# Patient Record
Sex: MALE | Race: WHITE | URBAN - METROPOLITAN AREA
[De-identification: names, ages, dates, MRNs, and addresses within clinical notes are randomized per-mention and may not be internally consistent; named-entity substitution may affect disease eponyms.]

---

## 2017-02-10 ENCOUNTER — CHARTING TRANS (OUTPATIENT)
Dept: OCCUPATIONAL MEDICINE | Age: 27
End: 2017-02-10

## 2017-02-16 ENCOUNTER — CHARTING TRANS (OUTPATIENT)
Dept: OCCUPATIONAL MEDICINE | Age: 27
End: 2017-02-16

## 2017-02-28 ENCOUNTER — CHARTING TRANS (OUTPATIENT)
Dept: OTHER | Age: 27
End: 2017-02-28

## 2017-03-14 ENCOUNTER — CHARTING TRANS (OUTPATIENT)
Dept: OCCUPATIONAL MEDICINE | Age: 27
End: 2017-03-14

## 2017-09-14 ENCOUNTER — APPOINTMENT (OUTPATIENT)
Dept: OCCUPATIONAL MEDICINE | Age: 27
End: 2017-09-14
Attending: EMERGENCY MEDICINE

## 2018-11-28 VITALS — TEMPERATURE: 97.8 F | HEART RATE: 82 BPM | DIASTOLIC BLOOD PRESSURE: 64 MMHG | SYSTOLIC BLOOD PRESSURE: 137 MMHG

## 2018-11-29 VITALS
TEMPERATURE: 97.2 F | DIASTOLIC BLOOD PRESSURE: 81 MMHG | SYSTOLIC BLOOD PRESSURE: 146 MMHG | RESPIRATION RATE: 16 BRPM | HEART RATE: 83 BPM

## 2018-11-29 VITALS
BODY MASS INDEX: 26.18 KG/M2 | HEART RATE: 88 BPM | TEMPERATURE: 97.4 F | RESPIRATION RATE: 16 BRPM | SYSTOLIC BLOOD PRESSURE: 150 MMHG | HEIGHT: 76 IN | DIASTOLIC BLOOD PRESSURE: 88 MMHG | WEIGHT: 215 LBS

## 2019-01-30 ENCOUNTER — HOSPITAL ENCOUNTER (OUTPATIENT)
Age: 29
Discharge: HOME | End: 2019-01-30
Attending: FAMILY MEDICINE

## 2019-01-30 VITALS
TEMPERATURE: 98 F | HEART RATE: 84 BPM | SYSTOLIC BLOOD PRESSURE: 129 MMHG | DIASTOLIC BLOOD PRESSURE: 63 MMHG | RESPIRATION RATE: 18 BRPM | WEIGHT: 215 LBS | OXYGEN SATURATION: 98 %

## 2019-01-30 DIAGNOSIS — L02.91 ABSCESS: Primary | ICD-10-CM

## 2019-01-30 PROCEDURE — 99204 OFFICE O/P NEW MOD 45 MIN: CPT

## 2019-01-30 PROCEDURE — 99203 OFFICE O/P NEW LOW 30 MIN: CPT

## 2019-01-30 RX ORDER — PREDNISONE 20 MG/1
40 TABLET ORAL DAILY
Qty: 10 TABLET | Refills: 0 | Status: SHIPPED | OUTPATIENT
Start: 2019-01-30 | End: 2019-02-04

## 2019-01-30 RX ORDER — TRAMADOL HYDROCHLORIDE 50 MG/1
50 TABLET ORAL EVERY 6 HOURS PRN
Qty: 8 TABLET | Refills: 0 | Status: SHIPPED | OUTPATIENT
Start: 2019-01-30 | End: 2019-02-01

## 2019-01-30 RX ORDER — SULFAMETHOXAZOLE AND TRIMETHOPRIM 800; 160 MG/1; MG/1
1 TABLET ORAL 2 TIMES DAILY
Qty: 14 TABLET | Refills: 0 | Status: SHIPPED | OUTPATIENT
Start: 2019-01-30 | End: 2019-02-06

## 2019-01-30 NOTE — ED PROVIDER NOTES
Patient Seen in: [address]    History   Patient presents with:  Anal Problem (GI)    Stated Complaint: Swelling    HPI    Is here after noticing a bump over the right buttock area painful to touch.   Denies noticing any r is not diaphoretic. Small ~ 1cm area of mild induration noted over the R buttock area towards the perirectal area. No erythema. No drainage or discharge, no fluctuation. Nursing note and vitals reviewed.         ED Course     MDM   Likely early absces

## 2019-02-02 NOTE — ED NOTES
PATIENT CALLED STATING \"I AM NOT GETTING ANY BETTER\" PATIENT INSTRUCTED TO GO TO THE ED.  PATIENT VERBALIZED

## 2019-09-16 ENCOUNTER — APPOINTMENT (OUTPATIENT)
Dept: GENERAL RADIOLOGY | Age: 29
End: 2019-09-16
Attending: FAMILY MEDICINE

## 2019-09-16 ENCOUNTER — HOSPITAL ENCOUNTER (OUTPATIENT)
Age: 29
Discharge: HOME | End: 2019-09-16
Attending: FAMILY MEDICINE

## 2019-09-16 VITALS
BODY MASS INDEX: 29.12 KG/M2 | OXYGEN SATURATION: 100 % | WEIGHT: 215 LBS | HEART RATE: 60 BPM | DIASTOLIC BLOOD PRESSURE: 70 MMHG | TEMPERATURE: 98 F | HEIGHT: 72 IN | SYSTOLIC BLOOD PRESSURE: 128 MMHG | RESPIRATION RATE: 16 BRPM

## 2019-09-16 DIAGNOSIS — S93.402A MILD SPRAIN OF LEFT ANKLE, INITIAL ENCOUNTER: Primary | ICD-10-CM

## 2019-09-16 PROCEDURE — 99213 OFFICE O/P EST LOW 20 MIN: CPT

## 2019-09-16 PROCEDURE — 73610 X-RAY EXAM OF ANKLE: CPT | Performed by: FAMILY MEDICINE

## 2019-09-16 NOTE — ED PROVIDER NOTES
Patient Seen in: [address]    History   Patient presents with:  Lower Extremity Injury (musculoskeletal)    Stated Complaint: Yetta Stake    HPI    HPI: Bernabe Ortega is a 33year old male who presents after an inju medial malleolus and the skin is intact. There is no ligamentous instability to anterior drawer. There is no notable deformity. There is no tenderness over the base of the fifth metatarsal. Achilles is palpated and intact functionally.  There is no tenderne

## 2022-08-11 ENCOUNTER — HOSPITAL ENCOUNTER (OUTPATIENT)
Age: 32
Discharge: HOME | End: 2022-08-11
Payer: COMMERCIAL

## 2022-08-11 ENCOUNTER — APPOINTMENT (OUTPATIENT)
Dept: GENERAL RADIOLOGY | Age: 32
End: 2022-08-11
Attending: EMERGENCY MEDICINE
Payer: COMMERCIAL

## 2022-08-11 VITALS
TEMPERATURE: 98 F | OXYGEN SATURATION: 98 % | DIASTOLIC BLOOD PRESSURE: 69 MMHG | HEART RATE: 67 BPM | RESPIRATION RATE: 18 BRPM | SYSTOLIC BLOOD PRESSURE: 151 MMHG
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
## 2022-08-11 DIAGNOSIS — T14.8XXA PUNCTURE WOUND: ICD-10-CM

## 2022-08-11 PROCEDURE — 99213 OFFICE O/P EST LOW 20 MIN: CPT

## 2022-08-11 PROCEDURE — 73130 X-RAY EXAM OF HAND: CPT | Performed by: EMERGENCY MEDICINE

## 2022-08-11 RX ORDER — CEFADROXIL 500 MG/1
500 CAPSULE ORAL 2 TIMES DAILY
Qty: 20 CAPSULE | Refills: 0 | Status: SHIPPED | OUTPATIENT
Start: 2022-08-11 | End: 2022-08-21

## 2022-08-11 RX ORDER — IBUPROFEN 600 MG/1
600 TABLET ORAL EVERY 6 HOURS PRN
Qty: 20 TABLET | Refills: 0 | Status: SHIPPED | OUTPATIENT
Start: 2022-08-11
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
## 2022-12-02 ENCOUNTER — HOSPITAL ENCOUNTER (OUTPATIENT)
Age: 32
Discharge: HOME | End: 2022-12-02
Payer: COMMERCIAL

## 2022-12-02 ENCOUNTER — APPOINTMENT (OUTPATIENT)
Dept: GENERAL RADIOLOGY | Age: 32
End: 2022-12-02
Attending: NURSE PRACTITIONER
Payer: COMMERCIAL

## 2022-12-02 VITALS
HEART RATE: 71 BPM | RESPIRATION RATE: 18 BRPM | TEMPERATURE: 98 F | DIASTOLIC BLOOD PRESSURE: 53 MMHG | SYSTOLIC BLOOD PRESSURE: 144 MMHG | OXYGEN SATURATION: 99 %

## 2022-12-02 DIAGNOSIS — S90.31XA CONTUSION OF RIGHT FOOT, INITIAL ENCOUNTER: Primary | ICD-10-CM

## 2022-12-02 PROCEDURE — 73630 X-RAY EXAM OF FOOT: CPT | Performed by: NURSE PRACTITIONER

## 2022-12-02 PROCEDURE — 99213 OFFICE O/P EST LOW 20 MIN: CPT

## 2022-12-02 NOTE — ED INITIAL ASSESSMENT (HPI)
Per pt having right foot pain since Tuesday when he dropped a pipe on foot, Thursday stepped funny and felt worse pain to foot. Reports pain with walking.

## 2024-07-08 ENCOUNTER — HOSPITAL ENCOUNTER (OUTPATIENT)
Age: 34
Discharge: HOME | End: 2024-07-08
Payer: COMMERCIAL

## 2024-07-08 ENCOUNTER — APPOINTMENT (OUTPATIENT)
Dept: GENERAL RADIOLOGY | Age: 34
End: 2024-07-08
Attending: NURSE PRACTITIONER
Payer: COMMERCIAL

## 2024-07-08 VITALS
WEIGHT: 220 LBS | HEIGHT: 72 IN | OXYGEN SATURATION: 98 % | TEMPERATURE: 98 F | BODY MASS INDEX: 29.8 KG/M2 | SYSTOLIC BLOOD PRESSURE: 132 MMHG | RESPIRATION RATE: 20 BRPM | HEART RATE: 70 BPM | DIASTOLIC BLOOD PRESSURE: 80 MMHG

## 2024-07-08 DIAGNOSIS — S69.91XA INJURY OF RIGHT HAND, INITIAL ENCOUNTER: Primary | ICD-10-CM

## 2024-07-08 DIAGNOSIS — S63.619A SPRAIN OF FINGER, UNSPECIFIED FINGER, INITIAL ENCOUNTER: ICD-10-CM

## 2024-07-08 PROCEDURE — 29125 APPL SHORT ARM SPLINT STATIC: CPT

## 2024-07-08 PROCEDURE — 73130 X-RAY EXAM OF HAND: CPT | Performed by: NURSE PRACTITIONER

## 2024-07-08 PROCEDURE — 99214 OFFICE O/P EST MOD 30 MIN: CPT

## 2024-07-08 RX ORDER — IBUPROFEN 600 MG/1
600 TABLET ORAL ONCE
Status: COMPLETED | OUTPATIENT
Start: 2024-07-08 | End: 2024-07-08
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
## 2024-07-15 ENCOUNTER — OFFICE VISIT (OUTPATIENT)
Dept: SURGERY | Facility: CLINIC | Age: 34
End: 2024-07-15

## 2024-07-15 DIAGNOSIS — S63.656A SPRAIN OF METACARPOPHALANGEAL (MCP) JOINT OF RIGHT LITTLE FINGER, INITIAL ENCOUNTER: ICD-10-CM

## 2024-07-15 DIAGNOSIS — S63.656A SPRAIN OF METACARPOPHALANGEAL (MCP) JOINT OF RIGHT LITTLE FINGER, INITIAL ENCOUNTER: Primary | ICD-10-CM

## 2024-07-15 DIAGNOSIS — S67.21XA CRUSHING INJURY OF RIGHT HAND, INITIAL ENCOUNTER: Primary | ICD-10-CM

## 2024-07-15 PROCEDURE — 29125 APPL SHORT ARM SPLINT STATIC: CPT | Performed by: OCCUPATIONAL THERAPIST

## 2024-07-15 RX ORDER — AMOXICILLIN AND CLAVULANATE POTASSIUM 875; 125 MG/1; MG/1
TABLET, FILM COATED ORAL
COMMUNITY
Start: 2024-07-11

## 2024-07-15 NOTE — PROGRESS NOTES
Subjective: I hurt my right hand.      Objective:     Current level of performance:  ADL: Independent  Work: No work involving the right hand.  Leisure: Not addressed    Measurements/Tests:  ROM:         N/A         Treatment Provided this day: Fabricated a right ulnar gutter splint per order.    Treatment Time: 30 minutes      Summary/Analysis of Treatment session: Tolerated the fabrication of a right ulnar gutter splint well.      Plan: To be compliant with the wear and care of right ulnar gutter splint x 2 weeks.      Follow up in:  x 10 days.          Mauro Mast  OTKAREN/L
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
## 2024-07-25 ENCOUNTER — TELEPHONE (OUTPATIENT)
Dept: SURGERY | Facility: CLINIC | Age: 34
End: 2024-07-25

## 2024-07-25 ENCOUNTER — OFFICE VISIT (OUTPATIENT)
Dept: SURGERY | Facility: CLINIC | Age: 34
End: 2024-07-25

## 2024-07-25 DIAGNOSIS — M25.641 JOINT STIFFNESS OF HAND, RIGHT: ICD-10-CM

## 2024-07-25 DIAGNOSIS — M62.81 DISTAL MUSCLE WEAKNESS: Primary | ICD-10-CM

## 2024-07-25 PROCEDURE — 97165 OT EVAL LOW COMPLEX 30 MIN: CPT | Performed by: OCCUPATIONAL THERAPIST

## 2024-07-25 PROCEDURE — 97110 THERAPEUTIC EXERCISES: CPT | Performed by: OCCUPATIONAL THERAPIST

## 2024-07-25 NOTE — PROGRESS NOTES
OCCUPATIONAL THERAPY EVALUATION:   Leif Bermudez   [id number]       SUBJECTIVE:    HX of Injury: RSF PM UCL sprain  Chief Complaint:  RSF discomfort wit AROM..    Precautions:  No work involving the right hand.  Premorbid Functional Status: Independent w/ Occ. duties, Independent w/ driving / sitting, Independent w/ ADL's  Current Level of Function: No work involving the right hand.  Employment: Temporary leave  Hand Dominance: right  Living Situation: Family  Barriers to Learning: None  Patient Goals: Full use of the right hand.    Imaging/Tests: X-ray        OBJECTIVE DATA:   PAIN:   Rating (1/10): 1/10 at rest, 2/10 with activity  Location:       ORTHOTICS:    Right Ulnar Gutter Splint      SENSORY:  Intact      AROM/PROM:  (Degrees)  RIGHT HAND:    Thumb IF MF RF SF   MP     80   PIP     80   DIP     40   ROOT     200 ROOT with discomfort.             STRENGTH: (lbs) Right Average Left Average   : NT NT   2 pt Pinch:     3 pt Pinch:     Lateral Pinch:         ASSESSMENT & PLAN OF CARE:    Treatment Provided: Patient was seen for an initial evaluation, hand washing   HEP:  AROM, Tendon glides, x 20 reps per set, x 5 sets daily. Reviewed hand elevation importance. Written handout was provided to reinforce today's treatment and educational session.       Rehabilitation Potential: Good    CLINICAL ASSESSMENT:    Patient/Caregiver Education Provided: Yes    Treatment Plan:  Therapeutic Exercise  Therapeutic Activities  Modalities  Patient/Family Education  Splinting: Right Ulnar Gutter splint until 08/28/2024    GOALS:  Short term goals to be reached in x 1 week:    1) Independent with HEP..    Long term goals to be reached by  08/12/2024:.        Patient will be seen 2 x /week for 2 weeks or a total of 4 visits.   Pt. was advised regarding the findings of this evaluation and agrees to the plan of care.     Mauro LOPES OTRL

## 2024-07-25 NOTE — TELEPHONE ENCOUNTER
Patient dropped off [facility] paperwork. He filled out the [employer]  for 2 separate companies and Paid one  fee.

## 2024-07-30 NOTE — TELEPHONE ENCOUNTER
Forms received in the forms department. Logged for processing. [employer] message sent for authorization.

## 2024-08-01 NOTE — TELEPHONE ENCOUNTER
Patient called to check on status of forms, stated forms needed to be completed by 8/6. Provided patient with a delay letter via [employer].

## 2024-08-07 ENCOUNTER — OFFICE VISIT (OUTPATIENT)
Dept: SURGERY | Facility: CLINIC | Age: 34
End: 2024-08-07

## 2024-08-07 DIAGNOSIS — M62.81 DISTAL MUSCLE WEAKNESS: Primary | ICD-10-CM

## 2024-08-07 DIAGNOSIS — M25.641 JOINT STIFFNESS OF HAND, RIGHT: ICD-10-CM

## 2024-08-07 PROCEDURE — 97110 THERAPEUTIC EXERCISES: CPT | Performed by: OCCUPATIONAL THERAPIST

## 2024-08-07 NOTE — PROGRESS NOTES
Subjective: My right hand is still sore.      Objective:     Current level of performance:  ADL: Independent  Work: 5 # lifting restriction with the right hand.  Leisure: Family    Measurements/Tests:  ROM:  Testing By: jay   Strength Right: 70 #      Strength Left: 145 #      Treatment Provided this day: Up dated bilateral hand strength measurements: Reviewed HEP:    AROM:   X 20 reps per set, x 5 sets daily:    Tendon glides:  X 10 reps per set, x 8 sets daily:    PROM:  To all digits:  X 15 reps each digit per set, x 5 sets daily    Treatment Time: 20 minutes      Summary/Analysis of Treatment session: Patient is progressing well with OT goals and objectives, however he is not ready to return to full work duty status for the [employer] at this time.      Plan: To Return to full work duty status by 08/19/2024:      Follow up in:  08/15/2024          Mauro Mast  OTR/L

## 2024-08-09 NOTE — TELEPHONE ENCOUNTER
Hi Dr. Zuniga,     *The ACKNOWLEDGE button has been moved to the top right ribbon*    Please sign off on 4 forms if you agree to:disability due to RRF, RSF sprain   (place your signature on the first page only)    -From your Inbasket, Highlight the patient and click Chart   -Double click the 7/25/24 Forms Completion telephone encounter  -Scroll down to the Media section   -Click the blue Hyperlink: Disability, Dr. Zuniga, 8/9/24, Disability2, Dr. Zuniga, 8/9/24, Disability3, Dr. Zuniga, 8/9/24 and Disability4, Dr. Zuniga, 8/9/24   -Click Acknowledge located in the top right ribbon/menu   -Drag the mouse into the blank space of the document and a + sign will appear. Left click to   electronically sign the document.     Thank you so much!  Amalia SINGH

## 2024-08-09 NOTE — TELEPHONE ENCOUNTER
Called patient to verify Return to work date/if patient Return to work w/restrictions of pending re-eval 8/12/24. Left voicemail to call [phone].

## 2024-08-12 NOTE — TELEPHONE ENCOUNTER
Forms completed and faxed to [facility] [phone]. [employer] message sent, fax confirm rec'd.

## 2024-08-15 ENCOUNTER — OFFICE VISIT (OUTPATIENT)
Dept: SURGERY | Facility: CLINIC | Age: 34
End: 2024-08-15

## 2024-08-15 DIAGNOSIS — M25.641 JOINT STIFFNESS OF HAND, RIGHT: ICD-10-CM

## 2024-08-15 DIAGNOSIS — M62.81 DISTAL MUSCLE WEAKNESS: Primary | ICD-10-CM

## 2024-08-15 PROCEDURE — 97110 THERAPEUTIC EXERCISES: CPT | Performed by: OCCUPATIONAL THERAPIST

## 2024-08-15 NOTE — PROGRESS NOTES
Subjective: I still have pain in my right hand.      Objective:     Current level of performance:  ADL: Independent  Work: On Leave  Leisure: Family    Measurements/Tests:  ROM:  Testing By: jay   Strength Right: 85 #      Strength Left: 160 #      Treatment Provided this day: Up dated bilateral hand strength measurements: Reviewed HEP as previously documented.    Treatment Time: 15 minutes      Summary/Analysis of Treatment session: Patient continues to have right hand discomfort during functional related tasks associated work activity needs.      Plan: To return to full unrestricted work activity in x 2 -3 weeks.      Follow up in:  08/19/2024          Mauro Mast  OTR/L

## 2024-08-19 ENCOUNTER — OFFICE VISIT (OUTPATIENT)
Dept: SURGERY | Facility: CLINIC | Age: 34
End: 2024-08-19

## 2024-08-19 ENCOUNTER — APPOINTMENT (OUTPATIENT)
Dept: SURGERY | Facility: CLINIC | Age: 34
End: 2024-08-19

## 2024-08-19 DIAGNOSIS — M62.81 DISTAL MUSCLE WEAKNESS: Primary | ICD-10-CM

## 2024-08-19 DIAGNOSIS — M25.641 JOINT STIFFNESS OF HAND, RIGHT: ICD-10-CM

## 2024-08-19 DIAGNOSIS — S63.656A SPRAIN OF METACARPOPHALANGEAL (MCP) JOINT OF RIGHT LITTLE FINGER, INITIAL ENCOUNTER: ICD-10-CM

## 2024-08-19 DIAGNOSIS — M79.601 PAIN OF RIGHT UPPER EXTREMITY: ICD-10-CM

## 2024-08-19 DIAGNOSIS — S67.21XA CRUSHING INJURY OF RIGHT HAND, INITIAL ENCOUNTER: Primary | ICD-10-CM

## 2024-08-19 PROCEDURE — 99214 OFFICE O/P EST MOD 30 MIN: CPT | Performed by: PLASTIC SURGERY

## 2024-08-19 PROCEDURE — 97110 THERAPEUTIC EXERCISES: CPT | Performed by: OCCUPATIONAL THERAPIST

## 2024-08-19 RX ORDER — METHYLPREDNISOLONE 4 MG/1
TABLET ORAL
Qty: 1 EACH | Refills: 0 | Status: SHIPPED | OUTPATIENT
Start: 2024-08-19

## 2024-08-19 NOTE — PROGRESS NOTES
Injury 1: RSF/RRF  - Date: 07/07/24  - Days Since: 43    Pt complains of pain with certain activity 6/10 feels sharp shooting pain to RSF also complains of weakness when making a fist   Last OT appt 8/15/24    43 days postinjury  RSF metacarpal pain with vigorous activities and with making a fist    His strength was 80 pounds last week and is now 60    He bar attended all weekend.  Minimal pain at the time but then developed pain afterwards    Full range of motion but with MP pain    Sagittal bands intact and not tender to stretch  UCL pain with stretch but stable  RCL stable    No lag  Good extension against resistance without pain    Sprain    RSF MP UCL    We discussed what a sprain is and the treatment options. This area may remain painful and stiff for a very long time (months, and in rare cases, permanently).  Questions were answered and the patient wishes to proceed with treatment.    Ulnar gutter splint for 1 week  Icing regimen  Medrol Dosepak        1 week      +++++++++++++++++++++++++++++++++++++++++      MEDICAL DECISION MAKING    PROBLEMS      MODERATE    (number / complexity)          Acute complicated injury    DATA         MODERATE    (amount / complexity)          X-rays independently reviewed    MANAGEMENT RISK  MODERATE    (complications/ morbidity)       Medrol                  MDM LEVEL    MODERATE

## 2024-08-26 ENCOUNTER — OFFICE VISIT (OUTPATIENT)
Dept: SURGERY | Facility: CLINIC | Age: 34
End: 2024-08-26

## 2024-08-26 DIAGNOSIS — S63.656A SPRAIN OF METACARPOPHALANGEAL (MCP) JOINT OF RIGHT LITTLE FINGER, INITIAL ENCOUNTER: Primary | ICD-10-CM

## 2024-08-26 DIAGNOSIS — M62.81 DISTAL MUSCLE WEAKNESS: Primary | ICD-10-CM

## 2024-08-26 DIAGNOSIS — M25.641 JOINT STIFFNESS OF HAND, RIGHT: ICD-10-CM

## 2024-08-26 PROCEDURE — 99213 OFFICE O/P EST LOW 20 MIN: CPT | Performed by: PLASTIC SURGERY

## 2024-08-26 PROCEDURE — 97110 THERAPEUTIC EXERCISES: CPT | Performed by: OCCUPATIONAL THERAPIST

## 2024-08-26 NOTE — PROGRESS NOTES
Subjective: My right garret is still a bit weak.      Objective:     Current level of performance:  ADL: Independent  Work: 5 # lifting restriction with the right hand.  Leisure: Family    Measurements/Tests:  ROM:  Testing By: jay   Strength Right: 80 #      Strength Left: 120 #      Treatment Provided this day: Up dated bilateral hand strength measurements: Physician follow-up.    Treatment Time: 20 minutes      Summary/Analysis of Treatment session: Patient could benefit from additional right hand strengthening regime.      Plan: To return to full unrestricted work duty by 09/09/2024:      Follow up in:  x 1 week          Mauro Mast  OTKAREN/L

## 2024-08-26 NOTE — PROGRESS NOTES
Injury 1: RSF MP UCL sprain  - Date: 07/07/24  - Days Since: 50      50 days postinjury  Slight pain in the hand is weak    Full range of motion  Strength 80 versus 120    No pain with sagittal band stretch    Very slight tenderness with UCL stretch    Discontinue splint  Unrestricted activity  OT for strengthening and endurance      +++++++++++++++++++++++++++++++++++++++++      MEDICAL DECISION MAKING    PROBLEMS      MODERATE    (number / complexity)          Acute complicated injury    DATA         STRAIGHTFORWARD    (amount / complexity)              MANAGEMENT RISK  LOW    (complications/ morbidity)       Splint/OT                  MDM LEVEL    LOW

## 2024-09-11 ENCOUNTER — OFFICE VISIT (OUTPATIENT)
Dept: SURGERY | Facility: CLINIC | Age: 34
End: 2024-09-11

## 2024-09-11 DIAGNOSIS — M25.641 JOINT STIFFNESS OF HAND, RIGHT: ICD-10-CM

## 2024-09-11 DIAGNOSIS — M62.81 DISTAL MUSCLE WEAKNESS: Primary | ICD-10-CM

## 2024-09-11 PROCEDURE — 97035 APP MDLTY 1+ULTRASOUND EA 15: CPT | Performed by: OCCUPATIONAL THERAPIST

## 2024-09-11 NOTE — PROGRESS NOTES
Subjective: I have pain in my right hand when I move it in certain positions.      Objective:     Current level of performance:  ADL: Independent  Work: 5  lifting restriction with the right hand.  Leisure: Bowling    Measurements/Tests:  ROM:         N/A         Treatment Provided this day: Ultrasoud to the right hand, dorsal surface 5th metacarpal:  8 min. Continuous 3.3 mhz w/cm squared. To reduce edema, increase circulation, soften scar tissue, for increased range of motion and reduction of pain. HEP.    Treatment Time: 20 minutes.      Summary/Analysis of Treatment session: Patient continues to have complaints of right hand discomfort, during unspecific functional tasks.      Plan: To return to full unrestricted work duty by 09/26/2024      Follow up in:  x 1 week.          Mauro Mast  OTKAREN/L

## 2024-09-17 ENCOUNTER — OFFICE VISIT (OUTPATIENT)
Dept: SURGERY | Facility: CLINIC | Age: 34
End: 2024-09-17

## 2024-09-17 DIAGNOSIS — M79.601 PAIN OF RIGHT UPPER EXTREMITY: Primary | ICD-10-CM

## 2024-09-17 DIAGNOSIS — M62.81 DISTAL MUSCLE WEAKNESS: ICD-10-CM

## 2024-09-17 DIAGNOSIS — M25.641 JOINT STIFFNESS OF HAND, RIGHT: ICD-10-CM

## 2024-09-17 PROCEDURE — 97035 APP MDLTY 1+ULTRASOUND EA 15: CPT | Performed by: OCCUPATIONAL THERAPIST

## 2024-09-17 NOTE — PROGRESS NOTES
Subjective: My right hand is still sore.      Objective:     Current level of performance:  ADL: Independent  Work: 5# lifting restriction with the right hand.  Leisure: Family    Measurements/Tests:  ROM:      N/A            Treatment Provided this day: Ultrasound to the right hand, 5th metacarpal Ulnar Border: 8 min. Continuous 3.3 mhz w/cm squared. To reduce edema, increase circulation, soften scar tissue, for increased range of motion and reduction of pain. HEP.    Treatment Time: 20 minutes      Summary/Analysis of Treatment session: Patient continues to have complaints of right hand discomfort, and verbalized the inability to perform work related task needs.      Plan: To see the physician 09/26/2024 for re-assessment.      Follow up in:  09/28/2024.          Mauro Mast  OTR/L

## 2024-09-26 ENCOUNTER — OFFICE VISIT (OUTPATIENT)
Dept: SURGERY | Facility: CLINIC | Age: 34
End: 2024-09-26

## 2024-09-26 DIAGNOSIS — M79.601 PAIN OF RIGHT UPPER EXTREMITY: Primary | ICD-10-CM

## 2024-09-26 DIAGNOSIS — S67.21XA CRUSHING INJURY OF RIGHT HAND, INITIAL ENCOUNTER: ICD-10-CM

## 2024-09-26 DIAGNOSIS — S63.656A SPRAIN OF METACARPOPHALANGEAL (MCP) JOINT OF RIGHT LITTLE FINGER, INITIAL ENCOUNTER: Primary | ICD-10-CM

## 2024-09-26 PROCEDURE — 99213 OFFICE O/P EST LOW 20 MIN: CPT | Performed by: PLASTIC SURGERY

## 2024-09-26 PROCEDURE — 97110 THERAPEUTIC EXERCISES: CPT | Performed by: OCCUPATIONAL THERAPIST

## 2024-09-26 NOTE — PROGRESS NOTES
Injury 1: RSF MP UCL sprain  - Date: 07/07/24  - Days Since: 81    81 days postinjury  No complaints.  No pain.  Normal function    Full painless range of motion  No tenderness  Collateral ligaments stable without pain    Strength 105 versus 140    Unrestricted activity  Return to regular work    Discharged.  To call if any problems or concerns.      +++++++++++++++++++++++++++++++++++++++++      MEDICAL DECISION MAKING    PROBLEMS      MODERATE    (number / complexity)          Acute complicated injury    DATA         STRAIGHTFORWARD    (amount / complexity)              MANAGEMENT RISK  LOW    (complications/ morbidity)       Splint/OT                  MDM LEVEL    LOW

## 2024-09-26 NOTE — PROGRESS NOTES
Subjective:I am a little better.      Objective:     Current level of performance:  ADL: Independent  Work: On leave  Leisure: Motorcycles    Measurements/Tests:  ROM:  Testing By: jay   Strength Right: # 105      Strength Left: 130 #      Treatment Provided this day: Up dated bilateral hand strength measurements: Physician follow-up.    Treatment Time: 20 minutes      Summary/Analysis of Treatment session: No further OT needs at this time.      Plan: Discontinue OT.      Follow up in:  To call with questions and or concerns.          Mauro Mast  OTR/L

## 2024-11-07 PROBLEM — S99.922A FOOT TRAUMA, LEFT, INITIAL ENCOUNTER: Status: ACTIVE | Noted: 2024-11-07

## 2024-11-07 PROBLEM — M79.672 LEFT FOOT PAIN: Status: ACTIVE | Noted: 2024-11-07

## 2024-11-14 ENCOUNTER — HOSPITAL ENCOUNTER (OUTPATIENT)
Dept: CT IMAGING | Age: 34
Discharge: HOME | End: 2024-11-14
Attending: ORTHOPAEDIC SURGERY
Payer: COMMERCIAL

## 2024-11-14 DIAGNOSIS — S99.922A FOOT TRAUMA, LEFT, INITIAL ENCOUNTER: ICD-10-CM

## 2024-11-14 DIAGNOSIS — M79.672 LEFT FOOT PAIN: ICD-10-CM

## 2024-11-14 PROCEDURE — 73700 CT LOWER EXTREMITY W/O DYE: CPT | Performed by: ORTHOPAEDIC SURGERY

## 2024-11-18 PROBLEM — S93.602D FOOT SPRAIN, LEFT, SUBSEQUENT ENCOUNTER: Status: ACTIVE | Noted: 2024-11-18

## 2024-11-18 PROBLEM — S90.32XA CONTUSION OF LEFT FOOT: Status: ACTIVE | Noted: 2024-11-07

## (undated) NOTE — LETTER
Date & Time: 7/8/2024, 9:52 AM  Patient: Leif Bermudez  Encounter Provider(s):    Li Garcia APRN       To Whom It May Concern:    Leif Bermudez was seen and treated in our department on 7/8/2024. He can return to work with these limitations: minimal use of right hand x 3 days - advise splint x 3 days .    If you have any questions or concerns, please do not hesitate to call.        _____________________________  Physician/APC Signature

## (undated) NOTE — LETTER
Date & Time: 8/11/2022, 4:59 PM  Patient: Carmelina Salter  Encounter Provider(s):    NILS Altamirano       To Whom It May Concern:    Parisa Josue was seen and treated in our department on 8/11/2022. He can return to work with these limitations 08/13/2022: No use of right hand for the next 5 to 7 days from today's date. If he is not okay for full duty by day 8, work-related paperwork, and work cleareance will be needed by specialist, or occupational health NOT urgent care/ER provider. He MAY get cleared earlier than 5 days by occupational health, or hand specialist if indicated. Brandon Borregor was placed on Duricef 500mg antibiotic for puncture wound, and ibuprofen 600 mg for pain.     NILS Hanh, 08/11/22, 5:00 PM